# Patient Record
Sex: FEMALE | Race: WHITE | URBAN - METROPOLITAN AREA
[De-identification: names, ages, dates, MRNs, and addresses within clinical notes are randomized per-mention and may not be internally consistent; named-entity substitution may affect disease eponyms.]

---

## 2024-02-03 ENCOUNTER — OFFICE VISIT (OUTPATIENT)
Age: 64
End: 2024-02-03

## 2024-02-03 ENCOUNTER — HOSPITAL ENCOUNTER (OUTPATIENT)
Age: 64
Discharge: HOME | End: 2024-02-03
Payer: COMMERCIAL

## 2024-02-03 ENCOUNTER — HOSPITAL ENCOUNTER (OUTPATIENT)
Dept: GENERAL RADIOLOGY | Age: 64
Discharge: HOME | End: 2024-02-03
Payer: COMMERCIAL

## 2024-02-03 VITALS
HEART RATE: 65 BPM | OXYGEN SATURATION: 95 % | BODY MASS INDEX: 32.4 KG/M2 | WEIGHT: 189.8 LBS | SYSTOLIC BLOOD PRESSURE: 144 MMHG | DIASTOLIC BLOOD PRESSURE: 74 MMHG | HEIGHT: 64 IN | TEMPERATURE: 98.3 F

## 2024-02-03 DIAGNOSIS — J22 LOWER RESP. TRACT INFECTION: Primary | ICD-10-CM

## 2024-02-03 DIAGNOSIS — J22 LOWER RESP. TRACT INFECTION: ICD-10-CM

## 2024-02-03 PROCEDURE — 71046 X-RAY EXAM CHEST 2 VIEWS: CPT

## 2024-02-03 RX ORDER — ISOSORBIDE MONONITRATE 60 MG/1
TABLET, EXTENDED RELEASE ORAL
COMMUNITY
Start: 2024-01-07

## 2024-02-03 RX ORDER — GUSELKUMAB 100 MG/ML
INJECTION SUBCUTANEOUS
COMMUNITY
Start: 2024-01-30

## 2024-02-03 RX ORDER — ESTRADIOL 0.5 MG/1
TABLET ORAL
COMMUNITY
Start: 2024-01-02

## 2024-02-03 RX ORDER — CLOPIDOGREL BISULFATE 75 MG/1
TABLET ORAL
COMMUNITY

## 2024-02-03 RX ORDER — AMLODIPINE BESYLATE 5 MG/1
TABLET ORAL
COMMUNITY
Start: 2024-01-07

## 2024-02-03 RX ORDER — BUPROPION HYDROCHLORIDE 300 MG/1
300 TABLET ORAL DAILY
Qty: 30 TABLET | Refills: 11 | COMMUNITY
Start: 2023-11-14 | End: 2024-11-13

## 2024-02-03 RX ORDER — MEDROXYPROGESTERONE ACETATE 2.5 MG/1
1 TABLET ORAL DAILY
COMMUNITY
Start: 2024-01-02

## 2024-02-03 RX ORDER — RANOLAZINE 500 MG/1
TABLET, EXTENDED RELEASE ORAL
COMMUNITY
Start: 2023-10-11

## 2024-02-03 RX ORDER — CYCLOBENZAPRINE HCL 5 MG
5 TABLET ORAL 3 TIMES DAILY PRN
COMMUNITY
Start: 2024-01-25

## 2024-02-03 RX ORDER — PANTOPRAZOLE SODIUM 40 MG/1
40 TABLET, DELAYED RELEASE ORAL DAILY
COMMUNITY
Start: 2023-11-14

## 2024-02-03 RX ORDER — ATORVASTATIN CALCIUM 80 MG/1
TABLET, FILM COATED ORAL
COMMUNITY
Start: 2024-01-07

## 2024-02-03 RX ORDER — CELECOXIB 100 MG/1
100 CAPSULE ORAL 2 TIMES DAILY
COMMUNITY
Start: 2023-11-14

## 2024-02-03 RX ORDER — DOXYCYCLINE HYCLATE 100 MG
100 TABLET ORAL 2 TIMES DAILY
Qty: 14 TABLET | Refills: 0 | Status: SHIPPED | OUTPATIENT
Start: 2024-02-03 | End: 2024-02-10

## 2024-02-03 ASSESSMENT — ENCOUNTER SYMPTOMS
SHORTNESS OF BREATH: 1
WHEEZING: 1
COUGH: 1
SORE THROAT: 0

## 2024-02-03 NOTE — PATIENT INSTRUCTIONS
Suspect you may be developing lower resp tract infection following previous flu.  Chest XR ordered and you can go to [facility] to get that taken.  Start on antibiotic of Doxycycline for 5-7 days.  If Pneumonia present may increase treatment.  Joint decision to forego steroid at this time.  Recommend Mucinex for expectorant effect.  Use Guaifenesin (Mucinex) over the counter to help with loosening secretions.  Consider Dextromethorphan (DM) to help with the coughing.  Can also try honey, menthol or eucalyptus oil to help reduce cough naturally.  Stay well hydrated and rested.  Return if symptoms persist or change.  Proceed to hospital for evaluation if any worsening symptoms or concerns.

## 2024-02-03 NOTE — PROGRESS NOTES
Kathe Joyce (:  1960) is a 63 y.o. female,New patient, here for evaluation of the following chief complaint(s):  Cough (Productive cough since last Tuesday. ) and Congestion (Congestion, loss of appetite, nausea  and slight diarrhea since last Tuesday.)      ASSESSMENT/PLAN:  Visit Diagnoses and Associated Orders       Lower resp. tract infection    -  Primary    XR CHEST (2 VW) [18672 CPT(R)]      doxycycline hyclate (VIBRA-TABS) 100 MG tablet [3535]           ORDERS WITHOUT AN ASSOCIATED DIAGNOSIS    metoprolol tartrate (LOPRESSOR) 25 MG tablet [93972]      amLODIPine (NORVASC) 5 MG tablet [3155]      pantoprazole (PROTONIX) 40 MG tablet [66051]      ranolazine (RANEXA) 500 MG extended release tablet [60488]      atorvastatin (LIPITOR) 80 MG tablet [24867]      clopidogrel (PLAVIX) 75 MG tablet [67977]      cyclobenzaprine (FLEXERIL) 5 MG tablet [19646]      TREMFYA 100 MG/ML SOPN [808478]      estradiol (ESTRACE) 0.5 MG tablet [57171]      isosorbide mononitrate (IMDUR) 60 MG extended release tablet [89714]      medroxyPROGESTERone (PROVERA) 2.5 MG tablet [2772]      celecoxib (CELEBREX) 100 MG capsule [84022]      buPROPion (WELLBUTRIN XL) 300 MG extended release tablet [02182]             Suspect you may be developing lower resp tract infection following previous flu.  Chest XR ordered and you can go to [facility] to get that taken.  Start on antibiotic of Doxycycline for 5-7 days.  If Pneumonia present may increase treatment.  Joint decision to forego steroid at this time.  Recommend Mucinex for expectorant effect.  Use Guaifenesin (Mucinex) over the counter to help with loosening secretions.  Consider Dextromethorphan (DM) to help with the coughing.  Can also try honey, menthol or eucalyptus oil to help reduce cough naturally.  Stay well hydrated and rested.  Return if symptoms persist or change.      SUBJECTIVE/OBJECTIVE:    Reports \"Flu\" 2 weeks ago initially but was not seen